# Patient Record
Sex: FEMALE | Race: WHITE | URBAN - METROPOLITAN AREA
[De-identification: names, ages, dates, MRNs, and addresses within clinical notes are randomized per-mention and may not be internally consistent; named-entity substitution may affect disease eponyms.]

---
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
## 2018-08-06 ENCOUNTER — ALLIED HEALTH/NURSE VISIT (OUTPATIENT)
Dept: FAMILY MEDICINE | Facility: CLINIC | Age: 18
End: 2018-08-06
Payer: COMMERCIAL

## 2018-08-06 DIAGNOSIS — Z11.1 SCREENING EXAMINATION FOR PULMONARY TUBERCULOSIS: Primary | ICD-10-CM

## 2018-08-06 PROCEDURE — 99207 ZZC NO CHARGE NURSE ONLY: CPT

## 2018-08-06 PROCEDURE — 86580 TB INTRADERMAL TEST: CPT

## 2018-08-06 NOTE — LETTER
August 10, 2018      Katie Maguire  [address] [place] [address]            Katie,    Your TB test is negative.     Resulted Orders   TB INTRADERMAL TEST   Result Value Ref Range    PPD Induration 0 0 - 5 mm    PPD Redness 0 mm       If you have any questions or concerns, please call the clinic at the number listed above.       Sincerely,      ELYSIA Stewart,CNP/ag

## 2018-08-06 NOTE — PROGRESS NOTES
The patient is asked the following questions today and these are her answers:    -Have you had a mantoux administered in the past 30 days?    No  -Have you had a previous positive Mantoux.  No  -Have you received BCG in the past.  No  -Have you had a live vaccine  (MMR, Varicella, OPV, Yellow Fever) in the last 6 weeks.  No  -Have you had and active  viral or bacterial infection in the past 6 weeks.  No  -Have you received corticosteroids or immunosuppressive agents in the past 6 weeks.  No  -Have you been diagnosed with HIV?  No  -Do you have a maglinancy?  No     Nayla Syed CMA

## 2018-08-06 NOTE — MR AVS SNAPSHOT
After Visit Summary   8/6/2018    Katie Maguire    MRN: [id number]           Patient Information     Date Of Birth          2000        Visit Information        Provider Department      8/6/2018 3:45 PM Cma/Lpn, Fl [facility]        Today's Diagnoses     Screening examination for pulmonary tuberculosis    -  1       Follow-ups after your visit        Your next 10 appointments already scheduled     Aug 08, 2018  3:45 PM CDT   Nurse Only with Fl Ll Cma/Lpn   [facility] ([facility])    [address] [facility] [address] [phone]              Who to contact     Normal or non-critical lab and imaging results will be communicated to you by MyChart, letter or phone within 4 business days after the clinic has received the results. If you do not hear from us within 7 days, please contact the clinic through MyChart or phone. If you have a critical or abnormal lab result, we will notify you by phone as soon as possible.  Submit refill requests through [employer] or call your pharmacy and they will forward the refill request to us. Please allow 3 business days for your refill to be completed.          If you need to speak with a  for additional information , please call: [phone]           Additional Information About Your Visit        Care EveryWhere ID     This is your Care EveryWhere ID. This could be used by other organizations to access your [place] medical records  [id number]         Blood Pressure from Last 3 Encounters:   11/20/17 118/64   07/21/17 104/60   07/10/17 123/65    Weight from Last 3 Encounters:   07/21/17 136 lb 3.2 oz (61.8 kg) (65 %)*   04/07/16 134 lb 6.4 oz (61 kg) (68 %)*   01/04/16 130 lb (59 kg) (62 %)*     * Growth percentiles are based on CDC 2-20 Years data.              We Performed the Following     TB INTRADERMAL TEST        Primary Care Provider Office Phone # Fax #    Ramses Samuels,  [phone]       [facility] PEDIATRICS [address]   [facility] [address]        Equal Access to Services     KENDRICK NEWMAN : Hadii aad ku hadmaikeljose Nenaabena, chelseymanuela osoriomitchellha, radha kacandyda ishaan, mauri johnson laNainaaixa cronin. So [facility] [phone].    ATENCIÓN: Si habla español, tiene a law disposición servicios gratuitos de asistencia lingüística. Llame al [phone].    We comply with applicable federal civil rights laws and [place] laws. We do not discriminate on the basis of race, color, national origin, age, disability, sex, sexual orientation, or gender identity.            Thank you!     Thank you for choosing [facility]  for your care. Our goal is always to provide you with excellent care. Hearing back from our patients is one way we can continue to improve our services. Please take a few minutes to complete the written survey that you may receive in the mail after your visit with us. Thank you!             Your Updated Medication List - Protect others around you: Learn how to safely use, store and throw away your medicines at www.disposemymeds.org.          This list is accurate as of 8/6/18  3:53 PM.  Always use your most recent med list.                   Brand Name Dispense Instructions for use Diagnosis    benzoyl peroxide 10 % topical liquid    benzoyl peroxide wash    227 g    Wash face daily    Acne vulgaris       clindamycin-benzoyl peroxide gel    BENZACLIN    50 g    Apply topically every morning    Acne vulgaris       Doxycycline Hyclate 100 MG Tbec     60 tablet    One pill daily    Acne vulgaris       doxycycline monohydrate 100 MG capsule     60 capsule    One tablet by mouth twice daily    Acne vulgaris       tazarotene 0.05 % Crea cream    TAZORAC    60 g    User every night    Acne vulgaris

## 2018-08-08 ENCOUNTER — ALLIED HEALTH/NURSE VISIT (OUTPATIENT)
Dept: FAMILY MEDICINE | Facility: CLINIC | Age: 18
End: 2018-08-08
Payer: COMMERCIAL

## 2018-08-08 DIAGNOSIS — Z11.1 SCREENING EXAMINATION FOR PULMONARY TUBERCULOSIS: Primary | ICD-10-CM

## 2018-08-08 LAB
PPDINDURATION: 0 MM (ref 0–5)
PPDREDNESS: 0 MM

## 2018-08-08 PROCEDURE — 99207 ZZC NO CHARGE NURSE ONLY: CPT

## 2018-08-08 NOTE — PROGRESS NOTES
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 No induration.  No swelling.  No redness.    Pt given print out for records    PALMER Lovelace  RN/Romulo Lacy

## 2018-08-08 NOTE — MR AVS SNAPSHOT
After Visit Summary   8/8/2018    Katie Maguire    MRN: [id number]           Patient Information     Date Of Birth          2000        Visit Information        Provider Department      8/8/2018 3:45 PM Cesar/Lpn, Fl [facility] [address] [facility]        Today's Diagnoses     Screening examination for pulmonary tuberculosis    -  1       Follow-ups after your visit        Who to contact     Normal or non-critical lab and imaging results will be communicated to you by MyChart, letter or phone within 4 business days after the clinic has received the results. If you do not hear from us within 7 days, please contact the clinic through MyChart or phone. If you have a critical or abnormal lab result, we will notify you by phone as soon as possible.  Submit refill requests through [employer] or call your pharmacy and they will forward the refill request to us. Please allow 3 business days for your refill to be completed.          If you need to speak with a  for additional information , please call: [phone]           Additional Information About Your Visit        Care EveryWhere ID     This is your Care EveryWhere ID. This could be used by other organizations to access your [place] medical records  [id number]         Blood Pressure from Last 3 Encounters:   11/20/17 118/64   07/21/17 104/60   07/10/17 123/65    Weight from Last 3 Encounters:   07/21/17 136 lb 3.2 oz (61.8 kg) (65 %)*   04/07/16 134 lb 6.4 oz (61 kg) (68 %)*   01/04/16 130 lb (59 kg) (62 %)*     * Growth percentiles are based on CDC 2-20 Years data.              Today, you had the following     No orders found for display       Primary Care Provider Office Phone # Fax #    Ramses Samuels [phone]       [facility] PEDIATRICS [address]   [facility] [address]        Equal Access to Services     KENDRICK NEWMAN : [place], navin jiménez, radha quiros,  mauri marshallfay willett'aan ah. So [facility] [phone].    ATENCIÓN: Si habla brandee, tiene a law disposición servicios gratuitos de asistencia lingüística. Tessie carrillo [phone].    We comply with applicable federal civil rights laws and [place] laws. We do not discriminate on the basis of race, color, national origin, age, disability, sex, sexual orientation, or gender identity.            Thank you!     Thank you for choosing [facility]  for your care. Our goal is always to provide you with excellent care. Hearing back from our patients is one way we can continue to improve our services. Please take a few minutes to complete the written survey that you may receive in the mail after your visit with us. Thank you!             Your Updated Medication List - Protect others around you: Learn how to safely use, store and throw away your medicines at www.disposemymeds.org.          This list is accurate as of 8/8/18  4:02 PM.  Always use your most recent med list.                   Brand Name Dispense Instructions for use Diagnosis    benzoyl peroxide 10 % topical liquid    benzoyl peroxide wash    227 g    Wash face daily    Acne vulgaris       clindamycin-benzoyl peroxide gel    BENZACLIN    50 g    Apply topically every morning    Acne vulgaris       Doxycycline Hyclate 100 MG Tbec     60 tablet    One pill daily    Acne vulgaris       doxycycline monohydrate 100 MG capsule     60 capsule    One tablet by mouth twice daily    Acne vulgaris       tazarotene 0.05 % Crea cream    TAZORAC    60 g    User every night    Acne vulgaris

## 2019-03-26 NOTE — PROGRESS NOTES
SUBJECTIVE:   CC: Katie Maguire is an 19 year old woman who presents for preventive health visit.     Chief Complaint   Patient presents with     Physical     declining pap      Medication Request     She would like to discuss birth control options, Pill.      Healthy Habits:    Do you get at least three servings of calcium containing foods daily (dairy, green leafy vegetables, etc.)? 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 yes    Do you see a dentist twice per year? yes    Do you have sleep apnea, excessive snoring or daytime drowsiness?no    HCM:  Discussed cervical cancer screening and HPV vaccine.  She declines both.    OCP; she has never been sexually active.  She is getting  in August, and would like to start birth control pill.  She would like to start the pill in the next few months, so she knows if she has any side effects.  She would also like to avoid having her period around the time of her wedding.    Today's PHQ-2 Score:   PHQ-2 ( 1999 Pf[employer]) 3/27/2019 7/21/2017   Q1: Little interest or pleasure in doing things 0 0   Q2: Feeling down, depressed or hopeless 0 0   PHQ-2 Score 0 0     Abuse: Current or Past(Physical, Sexual or Emotional)- No  Do you feel safe in your environment? Yes    Social History     Tobacco Use     Smoking status: Never Smoker     Smokeless tobacco: Never Used   Substance Use Topics     Alcohol use: No     Alcohol/week: 0.0 oz     If you drink alcohol do you typically have >3 drinks per day or >7 drinks per week? No                     Reviewed orders with patient.  Reviewed health maintenance and updated orders accordingly - Yes  Current Outpatient Medications   Medication Sig Dispense Refill     desogestrel-ethinyl estradiol (KARIVA) 0.15-0.02/0.01 MG (21/5) tablet  "Take 1 tablet by mouth daily 90 tablet 3       Mammogram not appropriate for this patient based on age.    Pertinent mammograms are reviewed under the imaging tab.  History of abnormal Pap smear: NO - age 21-29 PAP every 3 years recommended     Reviewed and updated as needed this visit by clinical staff  Tobacco  Allergies  Meds         Reviewed and updated as needed this visit by Provider          ROS:  CONSTITUTIONAL: NEGATIVE for fever, chills, change in weight  INTEGUMENTARU/SKIN: NEGATIVE for worrisome rashes, moles or lesions  EYES: NEGATIVE for vision changes or irritation  ENT: NEGATIVE for ear, mouth and throat problems  RESP: NEGATIVE for significant cough or SOB  BREAST: NEGATIVE for masses, tenderness or discharge  CV: NEGATIVE for chest pain, palpitations or peripheral edema  GI: NEGATIVE for nausea, abdominal pain, heartburn, or change in bowel habits  : NEGATIVE for unusual urinary or vaginal symptoms. Periods are regular.  MUSCULOSKELETAL: NEGATIVE for significant arthralgias or myalgia  NEURO: NEGATIVE for weakness, dizziness or paresthesias  PSYCHIATRIC: NEGATIVE for changes in mood or affect    OBJECTIVE:   /78 (BP Location: Right arm, Patient Position: Chair, Cuff Size: Adult Regular)   Pulse 76   Temp 98.3  F (36.8  C) (Tympanic)   Resp 14   Ht 1.6 m (5' 3\")   Wt 61.1 kg (134 lb 9.6 oz)   LMP 03/16/2019 (Approximate)   SpO2 98%   BMI 23.84 kg/m    EXAM:  GENERAL: healthy, alert and no distress  EYES: Eyes grossly normal to inspection, PERRL and conjunctivae and sclerae normal  HENT: ear canals and TM's normal, nose and mouth without ulcers or lesions  NECK: no adenopathy, no asymmetry, masses, or scars and thyroid normal to palpation  RESP: lungs clear to auscultation - no rales, rhonchi or wheezes  CV: regular rate and rhythm, normal S1 S2, no S3 or S4, no murmur, click or rub, no peripheral edema and peripheral pulses strong  ABDOMEN: soft, nontender, no hepatosplenomegaly, " "no masses and bowel sounds normal  MS: no gross musculoskeletal defects noted, no edema  SKIN: no suspicious lesions or rashes  NEURO: Normal strength and tone, mentation intact and speech normal  PSYCH: mentation appears normal, affect normal/bright      ASSESSMENT/PLAN:   1. Encounter for general adult medical examination without abnormal findings    2. Encounter for initial prescription of contraceptive pills -discussed the risks and benefits of birth control pill.  Discussed the potential side effects.  Discussed Sunday start.  - desogestrel-ethinyl estradiol (KARIVA) 0.15-0.02/0.01 MG (21/5) tablet; Take 1 tablet by mouth daily  Dispense: 90 tablet; Refill: 3    COUNSELING:   Reviewed preventive health counseling, as reflected in patient instructions    BP Readings from Last 1 Encounters:   03/27/19 102/78     Estimated body mass index is 23.84 kg/m  as calculated from the following:    Height as of this encounter: 1.6 m (5' 3\").    Weight as of this encounter: 61.1 kg (134 lb 9.6 oz).           reports that  has never smoked. she has never used smokeless tobacco.      Counseling Resources:  ATP IV Guidelines  Pooled Cohorts Equation Calculator  Breast Cancer Risk Calculator  FRAX Risk Assessment  ICSI Preventive Guidelines  Dietary Guidelines for Americans, 2010  [employer]'s My[employer]  ASA Prophylaxis  Lung CA Screening    Dr. Jaimee Jung, DO  [facility]    "

## 2019-03-26 NOTE — PATIENT INSTRUCTIONS
Preventive Health Recommendations  Female Ages 21 to 25     Yearly exam:     See your health care provider every year in order to  o Review health changes.   o Discuss preventive care.    o Review your medicines if your doctor has prescribed any.      You should be tested each year for STDs (sexually transmitted diseases).       Talk to your provider about how often you should have cholesterol testing.      Get a Pap test every three years. If you have an abnormal result, your doctor may have you test more often.      If you are at risk for diabetes, you should have a diabetes test (fasting glucose).     Shots:     Get a flu shot each year.     Get a tetanus shot every 10 years.     Consider getting the shot (vaccine) that prevents cervical cancer (Gardasil).    Nutrition:     Eat at least 5 servings of fruits and vegetables each day.    Eat whole-grain bread, whole-wheat pasta and brown rice instead of white grains and rice.    Get adequate Calcium and Vitamin D.     Lifestyle    Exercise at least 150 minutes a week each week (30 minutes a day, 5 days a week). This will help you control your weight and prevent disease.    Limit alcohol to one drink per day.    No smoking.     Wear sunscreen to prevent skin cancer.    See your dentist every six months for an exam and cleaning.      1. Start birth control pill on the first Sunday after your next period

## 2019-03-27 ENCOUNTER — OFFICE VISIT (OUTPATIENT)
Dept: FAMILY MEDICINE | Facility: CLINIC | Age: 19
End: 2019-03-27
Payer: COMMERCIAL

## 2019-03-27 VITALS
HEIGHT: 63 IN | WEIGHT: 134.6 LBS | BODY MASS INDEX: 23.85 KG/M2 | TEMPERATURE: 98.3 F | OXYGEN SATURATION: 98 % | DIASTOLIC BLOOD PRESSURE: 78 MMHG | HEART RATE: 76 BPM | SYSTOLIC BLOOD PRESSURE: 102 MMHG | RESPIRATION RATE: 14 BRPM

## 2019-03-27 DIAGNOSIS — Z00.00 ENCOUNTER FOR GENERAL ADULT MEDICAL EXAMINATION WITHOUT ABNORMAL FINDINGS: Primary | ICD-10-CM

## 2019-03-27 DIAGNOSIS — Z30.011 ENCOUNTER FOR INITIAL PRESCRIPTION OF CONTRACEPTIVE PILLS: ICD-10-CM

## 2019-03-27 PROCEDURE — 99395 PREV VISIT EST AGE 18-39: CPT | Performed by: INTERNAL MEDICINE

## 2019-03-27 RX ORDER — DESOGESTREL AND ETHINYL ESTRADIOL 21-5 (28)
1 KIT ORAL DAILY
Qty: 90 TABLET | Refills: 3 | Status: SHIPPED | OUTPATIENT
Start: 2019-03-27

## 2019-03-27 ASSESSMENT — PAIN SCALES - GENERAL: PAINLEVEL: NO PAIN (0)

## 2019-03-27 ASSESSMENT — MIFFLIN-ST. JEOR: SCORE: 1344.67

## 2019-03-27 NOTE — NURSING NOTE
"Chief Complaint   Patient presents with     Physical     declining pap      Medication Request     She would like to discuss birth control options, Pill.        Initial /78 (BP Location: Right arm, Patient Position: Chair, Cuff Size: Adult Regular)   Pulse 76   Temp 98.3  F (36.8  C) (Tympanic)   Resp 14   Ht 1.6 m (5' 3\")   Wt 61.1 kg (134 lb 9.6 oz)   LMP 03/16/2019 (Approximate)   SpO2 98%   BMI 23.84 kg/m   Estimated body mass index is 23.84 kg/m  as calculated from the following:    Height as of this encounter: 1.6 m (5' 3\").    Weight as of this encounter: 61.1 kg (134 lb 9.6 oz).    Medication Reconciliation: complete    Randi An MA  "

## 2020-01-16 ENCOUNTER — TELEPHONE (OUTPATIENT)
Dept: INTERNAL MEDICINE | Facility: CLINIC | Age: 20
End: 2020-01-16

## 2020-01-16 NOTE — TELEPHONE ENCOUNTER
Panel Management Review    Composite cancer screening  Chart review shows that this patient is due/due soon for the following Pap Smear  Summary:    Patient is due/failing the following:   PAP    Action needed:   Patient needs office visit for PAP.    Type of outreach:    Sent letter.    Questions for provider review:    None                                                                                                                                    Priya Mosley CMA (AAMA)   (aka: Doris Mosley)       Chart routed to Care Team .

## 2020-01-16 NOTE — LETTER
January 16, 2020      Katie Maguire  [address] [place] [address]          Dear Katie Maguire, [id number]    At [facility] we care about your health and are committed to providing quality patient care, which includes staying current on preventative cancer screenings.  You can increase your chances of finding and treating cancers through regular screenings.      Our records show that you are due for the following screening(s):      Pap Smear for cervical cancer - [phone] to schedule  Recommended every three years for women 21 and older  A Pap test is used to detect cervical cancer.  The test should be taken at least once every three years but women who are at a greater risk for cervical cancer may need to have the test more often.      You are at a greater risk for cervical cancer if:   - You have had a sexually transmitted disease   - You have had more than one sex partner   - You have had an abnormal pap test in the past    If you have a My-Chart Account, you also can schedule this appointment through there.    If you have already had one or all of the above screening tests at another facility, please call us so that we may update your chart.      Your partners in health,      Quality Committee   [facility]

## 2020-02-04 NOTE — TELEPHONE ENCOUNTER
(2nd attempt) Left a voice msg for pt to call back.  Whenever she calls back schedule PAP.  Priya Mosley CMA (MA)   (aka: Doris Mosley)

## 2020-02-10 DIAGNOSIS — Z30.011 ENCOUNTER FOR INITIAL PRESCRIPTION OF CONTRACEPTIVE PILLS: Primary | ICD-10-CM

## 2020-02-10 NOTE — LETTER
[facility]  [address] [address]  Phone: [phone]       February 14, 2020         Katie Maguire  [address]  [place] [address]            Dear Katie:    We are concerned about your health care.  We recently provided you with medication refills.  Many medications require routine follow-up with your doctor.    Your prescription(s) have been refilled for 30 days so you may have time for the above noted follow-up. Please call to schedule soon so we can assure you have an appointment before your next refills are needed.    Thank you,      Jaimee Jung DO / bonnie

## 2020-02-10 NOTE — TELEPHONE ENCOUNTER
"Requested Prescriptions   Pending Prescriptions Disp Refills     APRI 0.15-30 MG-MCG tablet [Pharmacy Med Name: APRI 28 DAY TABLET]  Last Written Prescription Date:  3/27/19  Last Fill Quantity: 90,  # refills: 3   Last Office Visit with G, P or [facility] prescribing provider:  3/27/19   Future Office Visit:      28 tablet 2     Sig: TAKE 1 TABLET BY MOUTH EVERY DAY       Contraceptives Protocol Failed - 2/10/2020  9:35 AM        Failed - Medication is active on med list        Passed - Patient is not a current smoker if age is 35 or older        Passed - Recent (12 mo) or future (30 days) visit within the authorizing provider's specialty     Patient has had an office visit with the authorizing provider or a provider within the authorizing providers department within the previous 12 mos or has a future within next 30 days. See \"Patient Info\" tab in inbasket, or \"Choose Columns\" in Meds & Orders section of the refill encounter.              Passed - No active pregnancy on record        Passed - No positive pregnancy test in past 12 months          "

## 2020-02-12 NOTE — TELEPHONE ENCOUNTER
(3rd attempt) Left a voice msg for pt to call back.  Whenever she calls back schedule PAP.  Priya Mosley CMA (MA)   (aka: Doris Mosley)

## 2020-02-14 RX ORDER — DESOGESTREL AND ETHINYL ESTRADIOL 0.15-0.03
KIT ORAL
Qty: 28 TABLET | Refills: 0 | Status: SHIPPED | OUTPATIENT
Start: 2020-02-14 | End: 2020-03-13

## 2020-03-10 DIAGNOSIS — Z30.011 ENCOUNTER FOR INITIAL PRESCRIPTION OF CONTRACEPTIVE PILLS: ICD-10-CM

## 2020-03-10 NOTE — TELEPHONE ENCOUNTER
"Requested Prescriptions   Pending Prescriptions Disp Refills     APRI 0.15-30 MG-MCG tablet [Pharmacy Med Name: APRI 28 DAY TABLET] 28 tablet 0     Sig: TAKE 1 TABLET BY MOUTH EVERY DAY       Contraceptives Protocol Passed - 3/10/2020  1:38 AM        Passed - Patient is not a current smoker if age is 35 or older        Passed - Recent (12 mo) or future (30 days) visit within the authorizing provider's specialty     Patient has had an office visit with the authorizing provider or a provider within the authorizing providers department within the previous 12 mos or has a future within next 30 days. See \"Patient Info\" tab in inbasket, or \"Choose Columns\" in Meds & Orders section of the refill encounter.              Passed - Medication is active on med list        Passed - No active pregnancy on record        Passed - No positive pregnancy test in past 12 months           Last Written Prescription Date:  2/14/2020  Last Fill Quantity: 28,  # refills: 0   Last office visit:3/27/2019 with prescribing provider:  Erich    Future Office Visit:      "

## 2020-03-13 RX ORDER — DESOGESTREL AND ETHINYL ESTRADIOL 0.15-0.03
KIT ORAL
Qty: 28 TABLET | Refills: 0 | Status: SHIPPED | OUTPATIENT
Start: 2020-03-13 | End: 2020-04-06

## 2020-03-13 NOTE — TELEPHONE ENCOUNTER
Routing refill request to provider for review/approval because:  Shanae given x1 and patient did not follow up, please advise  LOV 03/27/19 with PCP.  Due for clinic visit.      JUJU BrowerN, RN

## 2020-04-05 DIAGNOSIS — Z30.011 ENCOUNTER FOR INITIAL PRESCRIPTION OF CONTRACEPTIVE PILLS: ICD-10-CM

## 2020-04-06 RX ORDER — DESOGESTREL AND ETHINYL ESTRADIOL 0.15-0.03
KIT ORAL
Qty: 28 TABLET | Refills: 0 | Status: SHIPPED | OUTPATIENT
Start: 2020-04-06

## 2020-04-06 NOTE — TELEPHONE ENCOUNTER
Message below relayed to pt and she agreed with plan.   Insurance changed so she is not sure if she will have to schedule somewhere outside of [place] or not.     Terese MARTINES RN, BSN

## 2020-05-04 DIAGNOSIS — Z30.011 ENCOUNTER FOR INITIAL PRESCRIPTION OF CONTRACEPTIVE PILLS: ICD-10-CM

## 2020-05-04 NOTE — TELEPHONE ENCOUNTER
"Requested Prescriptions   Pending Prescriptions Disp Refills     desogestrel-ethinyl estradiol (APRI) 0.15-30 MG-MCG tablet 28 tablet 0     Sig: Take 1 tablet by mouth daily       Contraceptives Protocol Failed - 5/4/2020  1:15 PM        Failed - Recent (12 mo) or future (30 days) visit within the authorizing provider's specialty     Patient has had an office visit with the authorizing provider or a provider within the authorizing providers department within the previous 12 mos or has a future within next 30 days. See \"Patient Info\" tab in inbasket, or \"Choose Columns\" in Meds & Orders section of the refill encounter.              Passed - Patient is not a current smoker if age is 35 or older        Passed - Medication is active on med list        Passed - No active pregnancy on record        Passed - No positive pregnancy test in past 12 months           Last Written Prescription Date:  4/6/2020  Last Fill Quantity: 28,  # refills: 0   Last office visit: 3/27/2019 with prescribing provider:  Erich   Future Office Visit:            "

## 2020-05-06 RX ORDER — DESOGESTREL AND ETHINYL ESTRADIOL 0.15-0.03
1 KIT ORAL DAILY
Qty: 28 TABLET | Refills: 0 | OUTPATIENT
Start: 2020-05-06